# Patient Record
Sex: MALE | Race: WHITE | Employment: OTHER | URBAN - METROPOLITAN AREA
[De-identification: names, ages, dates, MRNs, and addresses within clinical notes are randomized per-mention and may not be internally consistent; named-entity substitution may affect disease eponyms.]

---

## 2022-05-24 ENCOUNTER — HOSPITAL ENCOUNTER (EMERGENCY)
Age: 42
Discharge: HOME | End: 2022-05-24
Attending: EMERGENCY MEDICINE
Payer: COMMERCIAL

## 2022-05-24 ENCOUNTER — APPOINTMENT (OUTPATIENT)
Dept: CT IMAGING | Age: 42
End: 2022-05-24
Payer: COMMERCIAL

## 2022-05-24 VITALS
HEART RATE: 82 BPM | OXYGEN SATURATION: 98 % | DIASTOLIC BLOOD PRESSURE: 78 MMHG | RESPIRATION RATE: 20 BRPM | TEMPERATURE: 98.6 F | SYSTOLIC BLOOD PRESSURE: 128 MMHG

## 2022-05-24 DIAGNOSIS — U07.1 COVID-19: Primary | ICD-10-CM

## 2022-05-24 DIAGNOSIS — S09.90XA INJURY OF HEAD, INITIAL ENCOUNTER: ICD-10-CM

## 2022-05-24 LAB
INFLUENZA A BY PCR: NOT DETECTED
INFLUENZA B BY PCR: NOT DETECTED
SARS-COV-2, NAAT: DETECTED

## 2022-05-24 PROCEDURE — 87635 SARS-COV-2 COVID-19 AMP PRB: CPT

## 2022-05-24 PROCEDURE — 90714 TD VACC NO PRESV 7 YRS+ IM: CPT | Performed by: PHYSICIAN ASSISTANT

## 2022-05-24 PROCEDURE — 96372 THER/PROPH/DIAG INJ SC/IM: CPT

## 2022-05-24 PROCEDURE — 99284 EMERGENCY DEPT VISIT MOD MDM: CPT

## 2022-05-24 PROCEDURE — 6360000002 HC RX W HCPCS: Performed by: PHYSICIAN ASSISTANT

## 2022-05-24 PROCEDURE — 70450 CT HEAD/BRAIN W/O DYE: CPT

## 2022-05-24 PROCEDURE — 90471 IMMUNIZATION ADMIN: CPT | Performed by: PHYSICIAN ASSISTANT

## 2022-05-24 PROCEDURE — 87502 INFLUENZA DNA AMP PROBE: CPT

## 2022-05-24 RX ORDER — TETANUS AND DIPHTHERIA TOXOIDS ADSORBED 2; 2 [LF]/.5ML; [LF]/.5ML
0.5 INJECTION INTRAMUSCULAR ONCE
Status: COMPLETED | OUTPATIENT
Start: 2022-05-24 | End: 2022-05-24

## 2022-05-24 RX ADMIN — TETANUS AND DIPHTHERIA TOXOIDS ADSORBED 0.5 ML: 2; 2 INJECTION INTRAMUSCULAR at 15:44

## 2022-05-24 ASSESSMENT — ENCOUNTER SYMPTOMS
NAUSEA: 0
ABDOMINAL PAIN: 0
DIARRHEA: 0
RHINORRHEA: 0
SHORTNESS OF BREATH: 0
COUGH: 0
BACK PAIN: 0
VOMITING: 0
SORE THROAT: 0

## 2022-05-24 ASSESSMENT — PAIN - FUNCTIONAL ASSESSMENT: PAIN_FUNCTIONAL_ASSESSMENT: NONE - DENIES PAIN

## 2022-05-24 NOTE — Clinical Note
Yin Mendoza was seen and treated in our emergency department on 5/24/2022. He may return to work on 05/26/2022. If you have any questions or concerns, please don't hesitate to call.       Lia Mtz, DO

## 2022-05-24 NOTE — ED NOTES
Department of Emergency Medicine  FIRST PROVIDER TRIAGE NOTE             Independent MLP           5/24/22  2:30 PM EDT    Date of Encounter: 5/24/22   MRN: [id number]      HPI: Jono Olson is a 35 y.o. male who presents to the ED for No chief complaint on file. head injury fever   ROS: Negative for cough or urinary complaints. PE: Gen Appearance/Constitutional: alert  CV: regular rate  Pulm: CTA bilat     Initial Plan of Care: All treatment areas with department are currently occupied. Plan to order/Initiate the following while awaiting opening in ED: labs and imaging studies.   Initiate Treatment-Testing, Proceed toTreatment Area When Bed Available for ED Attending/MLP to Continue Care    Electronically signed by ISIDORO Kim   DD: 5/24/22       ISIDORO Kim  05/24/22 [id number]

## 2022-05-24 NOTE — ED PROVIDER NOTES
[address]  Department of Emergency Medicine     Written by: Dimitry Heck DO  Patient Name: Sunitha Pham  Attending Provider: Nayana Eldridge DO  Admit Date: 2022  2:40 PM  MRN: [id number]    : 1980        Chief Complaint   Patient presents with    Head Injury     hit head last night, did not pass out. no thinners.  Headache    - Chief complaint    HPI   Sunitha Pham is a 35 y.o. male presenting to the ED for evaluation of Head Injury (hit head last night, did not pass out. no thinners.  ) and Headache    Patient is a 78-year-old male presenting to the ED for evaluation of head injury that occurred yesterday afternoon. Additionally is also stating that he feels fatigued, headache, and had a fever yesterday and today. He has not taken any pain medications or antipyretics. Patient states yesterday while remodeling his home he hit the top of his head on an overhead oven fan; denies any bleeding or laceration. He did not lose consciousness. He does not take any blood thinning medications. Denies any neck pain or stiffness or injuries anywhere else. States that he has a bump on the top of his head. He was feeling fine but then throughout the day started developing headache and additionally later had a fever and then had 1 today as well, T-max was 102 °F.  Currently he is afebrile. He denies any cough, congestion, shortness of breath, nausea or vomiting, dizziness, changes in vision, confusion, falls, gait abnormality, motor or sensory deficits. He denies any known sick contacts. His complaints overall are moderate in severity without specific alleviating factors, pain on the top of his head is worsened with palpation of the area that was hit. Review of Systems   Constitutional: Positive for fatigue and fever. Negative for chills. HENT: Negative for rhinorrhea and sore throat. Eyes: Negative for visual disturbance.    Respiratory: Negative for cough and shortness of breath. Cardiovascular: Negative for chest pain and palpitations. Gastrointestinal: Negative for abdominal pain, diarrhea, nausea and vomiting. Genitourinary: Negative for dysuria and frequency. Musculoskeletal: Negative for back pain and myalgias. Skin: Negative for rash and wound. Neurological: Positive for headaches. Negative for dizziness, syncope, weakness and numbness. Psychiatric/Behavioral: Negative for confusion. All other systems reviewed and are negative. Physical Exam  Vitals and nursing note reviewed. Constitutional:       General: He is not in acute distress. Appearance: He is not toxic-appearing. HENT:      Head: Normocephalic. Comments: Small mild hematoma and superficial abrasion to top of patient's scalp to the left; no laceration or bleeding noted     Right Ear: External ear normal.      Left Ear: External ear normal.      Nose: Nose normal. No rhinorrhea. Mouth/Throat:      Mouth: Mucous membranes are moist.      Pharynx: Oropharynx is clear. Eyes:      Extraocular Movements: Extraocular movements intact. Conjunctiva/sclera: Conjunctivae normal.      Pupils: Pupils are equal, round, and reactive to light. Cardiovascular:      Rate and Rhythm: Normal rate and regular rhythm. Pulses: Normal pulses. Heart sounds: Normal heart sounds. Pulmonary:      Effort: Pulmonary effort is normal. No respiratory distress. Breath sounds: Normal breath sounds. No wheezing or rales. Abdominal:      General: Bowel sounds are normal.      Palpations: Abdomen is soft. Tenderness: There is no abdominal tenderness. There is no guarding or rebound. Musculoskeletal:         General: No tenderness. Normal range of motion. Cervical back: Normal range of motion and neck supple. No rigidity or tenderness. Right lower leg: No edema. Left lower leg: No edema. Skin:     General: Skin is warm and dry. Capillary Refill: Capillary refill takes less than 2 seconds. Coloration: Skin is not jaundiced or pale. Findings: No rash. Neurological:      General: No focal deficit present. Mental Status: He is alert and oriented to person, place, and time. Cranial Nerves: No cranial nerve deficit. Sensory: No sensory deficit. Motor: No weakness. Coordination: Coordination normal.      Gait: Gait normal.   Psychiatric:         Mood and Affect: Mood normal.         Behavior: Behavior normal.          Procedures       MDM     78-year-old male presents for evaluation of head pain/headache after hitting the top of his head on an overhead oven fan yesterday. Additionally reports fatigue, general malaise, and 2 episodes of fever, once yesterday and once today. Alert and oriented on arrival, no acute distress. Exam as noted above. Small mild scalp hematoma that is TTP is noted to the top of his head, mild superficial abrasion, no laceration or bleeding noted. No midline cervical spine tenderness. Lungs CTA bilaterally. No other outward signs of injury. No focal neurologic deficits. Patient ambulated in the part without issue. Vitals are stable and he is nontoxic in appearance. He is afebrile. CT head shows no acute abnormality. Patient tetanus shot was updated. He declined any need for pain medication during this encounter. He was swabbed for flu and COVID, flu is negative, rapid COVID is positive. Results were discussed with the patient. Feel he is stable and appropriate for discharge. Return precautions were discussed. He voiced understanding and is amenable to this plan. ED Course as of 05/24/22 1610   Tue May 24, 2022   1606 ATTENDING PROVIDER ATTESTATION:     I have personally performed and/or participated in the history, exam, medical decision making, and procedures and agree with all pertinent clinical information unless otherwise noted.       I have also reviewed and agree with the past medical, family and social history unless otherwise noted. I have discussed this patient in detail with the resident, and provided the instruction and education regarding patient here stating that he hit his head the other day, he was doing some work on his house and hit his head on the vent. It knocked him backwards but he did not pass out. No seizures. No nausea or vomiting. No extremity numbness, tingling, paresthesias or weakness. Since then however he is developed intermittent mild fevers with some general body aches worse yesterday than today, states he is feeling a little bit better today. No cough or congestion and no chest pain or shortness of breath. .  My findings/plan: Patient is sitting in the bed resting comfortably no distress. No sign of acute head or face injury. Heart rate regular, lungs are clear and equal.  Abdomen nontender. No adenopathy or meningeal signs. Work-up results are discussed with the patient. [NC]      ED Course User Index  [NC] Martha Malhotra DO         I have discussed this patient with my attending, who has seen the patient and agrees with this disposition. Patient was seen and evaluated by myself and my attending Martha Malhotra DO. Assessment and Plan discussed with attending provider, please see attestation for final plan of care.       --------------------------------------------- PAST HISTORY ---------------------------------------------  Past Medical History:  has no past medical history on file. Past Surgical History:  has no past surgical history on file. Social History:      Family History: family history is not on file. The patients home medications have been reviewed. Allergies: Patient has no known allergies.     -------------------------------------------------- RESULTS -------------------------------------------------  Labs:  Results for orders placed or performed during the hospital encounter of 05/24/22   Rapid influenza A/B antigens    Specimen: Nasopharyngeal   Result Value Ref Range    Influenza A by PCR Not Detected Not Detected    Influenza B by PCR Not Detected Not Detected   COVID-19, Rapid    Specimen: Nasopharyngeal Swab   Result Value Ref Range    SARS-CoV-2, NAAT DETECTED (A) Not Detected       Radiology:  CT HEAD WO CONTRAST   Final Result   No acute intracranial abnormality.               ------------------------- NURSING NOTES AND VITALS REVIEWED ---------------------------  Date / Time Roomed:  5/24/2022  2:40 PM  ED Bed Assignment:  PEGGY/PEGGY    The nursing notes within the ED encounter and vital signs as below have been reviewed. /78   Pulse 82   Temp 98.6 °F (37 °C) (Oral)   Resp 20   SpO2 98%   Oxygen Saturation Interpretation: Normal      ------------------------------------------ PROGRESS NOTES ------------------------------------------  9:31 PM EDT  I have spoken with the patient and discussed todays results, in addition to providing specific details for the plan of care and counseling regarding the diagnosis and prognosis. Their questions are answered at this time and they are agreeable with the plan. I discussed at length with them reasons for immediate return here for re evaluation. They will followup with their primary care physician by calling their office tomorrow. --------------------------------- ADDITIONAL PROVIDER NOTES ---------------------------------  At this time the patient is without objective evidence of an acute process requiring hospitalization or inpatient management. They have remained hemodynamically stable throughout their entire ED visit and are stable for discharge with outpatient follow-up. The plan has been discussed in detail and they are aware of the specific conditions for emergent return, as well as the importance of follow-up. There are no discharge medications for this patient. Diagnosis:  1. COVID-19    2.  Injury of head, initial encounter        Disposition:  Patient's disposition: Discharge to home  Patient's condition is stable.        Jurgen Flores DO  Resident  05/24/22 [id number]

## 2022-05-24 NOTE — Clinical Note
Tea Laure was seen and treated in our emergency department on 5/24/2022. He may return to work on 05/26/2022. If you have any questions or concerns, please don't hesitate to call.       Elizabeth Morejon, DO